# Patient Record
Sex: MALE | Race: WHITE | Employment: FULL TIME | URBAN - METROPOLITAN AREA
[De-identification: names, ages, dates, MRNs, and addresses within clinical notes are randomized per-mention and may not be internally consistent; named-entity substitution may affect disease eponyms.]

---

## 2017-01-09 ENCOUNTER — TELEPHONE (OUTPATIENT)
Dept: UROLOGY | Facility: CLINIC | Age: 37
End: 2017-01-09

## 2017-01-09 ENCOUNTER — MYC MEDICAL ADVICE (OUTPATIENT)
Dept: UROLOGY | Facility: CLINIC | Age: 37
End: 2017-01-09

## 2017-01-09 ENCOUNTER — OFFICE VISIT (OUTPATIENT)
Dept: UROLOGY | Facility: CLINIC | Age: 37
End: 2017-01-09

## 2017-01-09 VITALS
HEART RATE: 60 BPM | HEIGHT: 69 IN | DIASTOLIC BLOOD PRESSURE: 69 MMHG | SYSTOLIC BLOOD PRESSURE: 122 MMHG | BODY MASS INDEX: 29.77 KG/M2 | WEIGHT: 201 LBS

## 2017-01-09 DIAGNOSIS — N20.1 CALCULUS OF URETER: Primary | ICD-10-CM

## 2017-01-09 DIAGNOSIS — R31.0 GROSS HEMATURIA: Primary | ICD-10-CM

## 2017-01-09 DIAGNOSIS — N50.811 TESTICULAR PAIN, RIGHT: ICD-10-CM

## 2017-01-09 LAB
ALBUMIN UR-MCNC: NEGATIVE MG/DL
APPEARANCE UR: CLEAR
BILIRUB UR QL STRIP: NEGATIVE
COLOR UR AUTO: YELLOW
GLUCOSE UR STRIP-MCNC: NEGATIVE MG/DL
HGB UR QL STRIP: ABNORMAL
KETONES UR STRIP-MCNC: NEGATIVE MG/DL
LEUKOCYTE ESTERASE UR QL STRIP: NEGATIVE
MUCOUS THREADS #/AREA URNS LPF: PRESENT /LPF
NITRATE UR QL: NEGATIVE
PH UR STRIP: 5 PH (ref 5–7)
RBC #/AREA URNS AUTO: 6 /HPF (ref 0–2)
SP GR UR STRIP: 1.01 (ref 1–1.03)
SQUAMOUS #/AREA URNS AUTO: <1 /HPF (ref 0–1)
URN SPEC COLLECT METH UR: ABNORMAL
UROBILINOGEN UR STRIP-MCNC: 0 MG/DL (ref 0–2)
WBC #/AREA URNS AUTO: <1 /HPF (ref 0–2)

## 2017-01-09 RX ORDER — TAMSULOSIN HYDROCHLORIDE 0.4 MG/1
0.4 CAPSULE ORAL DAILY
Qty: 30 CAPSULE | Refills: 0 | Status: SHIPPED | OUTPATIENT
Start: 2017-01-09

## 2017-01-09 RX ORDER — OXYCODONE HYDROCHLORIDE 5 MG/1
5-10 TABLET ORAL EVERY 6 HOURS PRN
Qty: 18 TABLET | Refills: 0 | Status: SHIPPED | OUTPATIENT
Start: 2017-01-09 | End: 2017-02-06

## 2017-01-09 ASSESSMENT — ENCOUNTER SYMPTOMS
HEARTBURN: 0
HYPOTENSION: 0
BLOATING: 0
HEMATURIA: 1
CLAUDICATION: 0
RECTAL PAIN: 0
FLANK PAIN: 0
ORTHOPNEA: 0
LIGHT-HEADEDNESS: 0
LEG SWELLING: 0
DIARRHEA: 0
ABDOMINAL PAIN: 1
EXERCISE INTOLERANCE: 0
PALPITATIONS: 1
BOWEL INCONTINENCE: 0
NAUSEA: 0
DIFFICULTY URINATING: 0
SYNCOPE: 0
TACHYCARDIA: 1
CONSTIPATION: 0
LEG PAIN: 0
DYSURIA: 0
BLOOD IN STOOL: 0
RECTAL BLEEDING: 0
SLEEP DISTURBANCES DUE TO BREATHING: 0
VOMITING: 0
HYPERTENSION: 0
JAUNDICE: 0

## 2017-01-09 NOTE — PROGRESS NOTES
CC: gross hematuria, testicular pain    HPI: It is a pleasure to see Mr. Jimbo Flores, a very pleasant 36 year old male seen today in the urology clinic for evaluation of gross hematuria and right-sided testicular pain. Symptoms started on 12/19/16 when he experienced some fairly intense pain in the lower abdomen which seemed to radiate into the right testicle. That night, he noticed that his urine was pink. This persisted for 24 hours and then both hematuria and pain resolved. Incidentally, he had recurrence of the pain yesterday, although this time it is localized to the right testicle. Currently feels a dull ache in the right testicle, localized to the back side of the testicle, rates it 3/10. Nothing seems to make it worse or better. No gross hematuria currently. He denies dysuria, frequency, urgency, hesitancy, intermittency, abdominal/back pain, fevers, chills, N/V. No history of kidney stones or UTI's. No concern for STD. No recent injury/trauma.    Review of Diagnostics:  12/20/16 - UA: large blood, 0-2 RBC, 0-2 WBC    Hematuria Risk Factors:  Age >40: no  Smoking history: no  Occupational exposure to chemicals or dyes (ie, benzenes, aromatic amines): no  History of urologic disorder or disease: no  History of irritative voiding symptoms: no  History of urinary tract infection: no  Analgesic abuse: no  History of pelvic irradiation: no    Past Medical History   Diagnosis Date     Atrial fibrillation (H)      Past Surgical History   Procedure Laterality Date     Left knee exploratory arthroscopy       Cardiac surgery  12/23/2015     ablation     H ablation focal afib       Current Outpatient Prescriptions   Medication Sig Dispense Refill     escitalopram (LEXAPRO) 10 MG tablet Take 1 tablet (10 mg) by mouth daily 90 tablet 1     acetaZOLAMIDE (DIAMOX SEQUEL) 500 MG 12 hr capsule Take 1 capsule (500 mg) by mouth 4 times daily 120 capsule 11     acetaZOLAMIDE (DIAMOX SEQUEL) 500 MG capsule Take 1 capsule  (500 mg) by mouth 2 times daily 60 capsule 6     atenolol (TENORMIN) 50 MG tablet Take once daily as needed for palpitations. 30 tablet 3     Allergies   Allergen Reactions     Bees      swollen     FAMILY HISTORY: There is no reported history of genitourinary carcinoma. Father with BPH.  There is no history of urolithiasis.      SOCIAL HISTORY: The patient does not smoke cigarettes, minimal EtOH and no illicit drug use.    ROS: A comprehensive 14 point ROS was obtained and was positive for h/o paroxysmal A. Fib s/p ablation and otherwise negative except for that outlined above in the HPI.    PHYSICAL EXAM:   There were no vitals filed for this visit.  GENERAL: Well groomed, well developed, well nourished male in NAD.  HEENT: AT, NC, EOMI bilaterally.  SKIN: Warm to touch, dry.  No visible rashes or lesions on examined areas.  RESP: No increased respiratory effort.  MS: Full ROM in extremities.  : Circumcised penis, no penile plaques or lesions.  Orthotopic location of the urethral meatus without meatal stenosis. No discharge.  Testicles descended bilaterally, of normal size and consistency, no masses. Non-tender.  Left epididymis without mass, non-tender. Right epididymis without mass, very mild TTP.  Vas and cord normal bilaterally. No appreciable hernias.  ARRON: Deferred for now.  NEURO: Alert and oriented x 3.  PSYCH: Normal mood and affect, pleasant and agreeable during interview and exam.    LABS: UA/UC from today pending.    IMAGING: Gopi    ASSESSMENT and PLAN:    Mr. Jimbo Flores is a pleasant 36 year old male with right-sided scrotal pain and one episode of gross hematuria (pink urine) that first occurred on 12/19/16, resolved, and then pain occurred again yesterday and persists today. Exam is unremarkable aside from some very mild TTP of the right epididymis. Low suspicion for acute infectious process. UA from today pending. Possible that he may be passing/passed a small kidney stone? Will obtain  CT abd/pelv to further evaluate. Ordered as w/ and w/o contrast given gross hematuria.   - Ordered UA/UC, cytology  - Obtain CT urogram    Will call with results of above. Pending findings, consider additional evaluation to include cystoscopy. If scrotal pain worsens and CT scan is negative, consider trial of an antibiotic for possible early right-sided epididymitis.    Thank you for allowing me to participate in Mr. Flores's care.      About 25 minutes were spent with the patient today, > 50% in counseling and coordination of care.    Albina Brown PA-C  Department of Urology

## 2017-01-09 NOTE — TELEPHONE ENCOUNTER
Called patient to discuss results of CT abd/pelvis which demonstrates a 3x4 mm stone in the mid-right ureter with moderate right hydronephrosis. UA also positive for blood which correlates with the ureteral stone but no evidence for infection. Will start him on tamsulosin 0.4 mg daily as trial at medical expulsive therapy. Rx sent, side effects discussed. Instructed to  a strainer at the clinic and strain urine to hopefully catch any stone that passes. Submit any captured stones for analysis. He requests prescription for pain meds as ibuprofen is helpful but does not always work when his pain is moderate to severe. Rx for Roxicodone 5 mg (#18) will be left for patient, along with his strainer, at the main floor check in of the [facility] building. Will plan to update a KUB in 1-2 weeks to monitor stone progression, unless he passes the stone before then in which no x-ray is needed. Patient was provided the number to imaging at the [facility] and will call to schedule his KUB in 1-2 weeks. Discussed warning signs/ER precautions including fevers, chills, vomiting, severe pain not responsive to pain meds. Patient understands to proceed to the ED for prompt evaluation should any of these occur. Otherwise, will follow up in 1-2 weeks with KUB and I will call with results to determine next steps/follow up. Patient verbalized understanding and agreement with the above plan.  Albina Brown PA-C  Urology

## 2017-01-09 NOTE — NURSING NOTE
Chief Complaint   Patient presents with     Consult     testicaular pain        Marisela Mckinley MA

## 2017-01-09 NOTE — Clinical Note
1/9/2017       RE: Jimbo Flores  [address] [place] [address]     Dear Colleague,    Thank you for referring your patient, Jimbo Flores, to the [facility] at [facility]. Please see a copy of my visit note below.    CC: gross hematuria, testicular pain    HPI: It is a pleasure to see Mr. Jimbo Flores, a very pleasant 36 year old male seen today in the urology clinic for evaluation of gross hematuria and right-sided testicular pain. Symptoms started on 12/19/16 when he experienced some fairly intense pain in the lower abdomen which seemed to radiate into the right testicle. That night, he noticed that his urine was pink. This persisted for 24 hours and then both hematuria and pain resolved. Incidentally, he had recurrence of the pain yesterday, although this time it is localized to the right testicle. Currently feels a dull ache in the right testicle, localized to the back side of the testicle, rates it 3/10. Nothing seems to make it worse or better. No gross hematuria currently. He denies dysuria, frequency, urgency, hesitancy, intermittency, abdominal/back pain, fevers, chills, N/V. No history of kidney stones or UTI's. No concern for STD. No recent injury/trauma.    Review of Diagnostics:  12/20/16 - UA: large blood, 0-2 RBC, 0-2 WBC    Hematuria Risk Factors:  Age >40: no  Smoking history: no  Occupational exposure to chemicals or dyes (ie, benzenes, aromatic amines): no  History of urologic disorder or disease: no  History of irritative voiding symptoms: no  History of urinary tract infection: no  Analgesic abuse: no  History of pelvic irradiation: no    Past Medical History   Diagnosis Date     Atrial fibrillation (H)      Past Surgical History   Procedure Laterality Date     Left knee exploratory arthroscopy       Cardiac surgery  12/23/2015     ablation     H ablation focal afib       Current  Outpatient Prescriptions   Medication Sig Dispense Refill     escitalopram (LEXAPRO) 10 MG tablet Take 1 tablet (10 mg) by mouth daily 90 tablet 1     acetaZOLAMIDE (DIAMOX SEQUEL) 500 MG 12 hr capsule Take 1 capsule (500 mg) by mouth 4 times daily 120 capsule 11     acetaZOLAMIDE (DIAMOX SEQUEL) 500 MG capsule Take 1 capsule (500 mg) by mouth 2 times daily 60 capsule 6     atenolol (TENORMIN) 50 MG tablet Take once daily as needed for palpitations. 30 tablet 3     Allergies   Allergen Reactions     Bees      swollen     FAMILY HISTORY: There is no reported history of genitourinary carcinoma. Father with BPH.  There is no history of urolithiasis.      SOCIAL HISTORY: The patient does not smoke cigarettes, minimal EtOH and no illicit drug use.    ROS: A comprehensive 14 point ROS was obtained and was positive for h/o paroxysmal A. Fib s/p ablation and otherwise negative except for that outlined above in the HPI.    PHYSICAL EXAM:   There were no vitals filed for this visit.  GENERAL: Well groomed, well developed, well nourished male in NAD.  HEENT: AT, NC, EOMI bilaterally.  SKIN: Warm to touch, dry.  No visible rashes or lesions on examined areas.  RESP: No increased respiratory effort.  MS: Full ROM in extremities.  : Circumcised penis, no penile plaques or lesions.  Orthotopic location of the urethral meatus without meatal stenosis. No discharge.  Testicles descended bilaterally, of normal size and consistency, no masses. Non-tender.  Left epididymis without mass, non-tender. Right epididymis without mass, very mild TTP.  Vas and cord normal bilaterally. No appreciable hernias.  ARRON: Deferred for now.  NEURO: Alert and oriented x 3.  PSYCH: Normal mood and affect, pleasant and agreeable during interview and exam.    LABS: UA/UC from today pending.    IMAGING: Gopi    ASSESSMENT and PLAN:    Mr. Jimbo Flores is a pleasant 36 year old male with right-sided scrotal pain and one episode of gross hematuria  (pink urine) that first occurred on 12/19/16, resolved, and then pain occurred again yesterday and persists today. Exam is unremarkable aside from some very mild TTP of the right epididymis. Low suspicion for acute infectious process. UA from today pending. Possible that he may be passing/passed a small kidney stone? Will obtain CT abd/pelv to further evaluate. Ordered as w/ and w/o contrast given gross hematuria.   - Ordered UA/UC, cytology  - Obtain CT urogram    Will call with results of above. Pending findings, consider additional evaluation to include cystoscopy. If scrotal pain worsens and CT scan is negative, consider trial of an antibiotic for possible early right-sided epididymitis.    Thank you for allowing me to participate in Mr. Flores's care.      About 25 minutes were spent with the patient today, > 50% in counseling and coordination of care.    Albina Brown PA-C  Department of Urology

## 2017-01-10 LAB
BACTERIA SPEC CULT: NO GROWTH
COPATH REPORT: NORMAL
Lab: NORMAL
MICRO REPORT STATUS: NORMAL
SPECIMEN SOURCE: NORMAL

## 2017-02-06 DIAGNOSIS — R10.9 FLANK PAIN: ICD-10-CM

## 2017-02-06 DIAGNOSIS — N20.1 CALCULUS OF URETER: ICD-10-CM

## 2017-02-06 DIAGNOSIS — N20.1 RIGHT URETERAL CALCULUS: Primary | ICD-10-CM

## 2017-02-06 RX ORDER — OXYCODONE HYDROCHLORIDE 5 MG/1
5-10 TABLET ORAL EVERY 6 HOURS PRN
Qty: 24 TABLET | Refills: 0 | Status: SHIPPED | OUTPATIENT
Start: 2017-02-06

## 2017-02-07 ENCOUNTER — OFFICE VISIT (OUTPATIENT)
Dept: UROLOGY | Facility: CLINIC | Age: 37
End: 2017-02-07
Payer: COMMERCIAL

## 2017-02-07 ENCOUNTER — HOSPITAL ENCOUNTER (OUTPATIENT)
Facility: CLINIC | Age: 37
End: 2017-02-07
Attending: UROLOGY | Admitting: UROLOGY
Payer: COMMERCIAL

## 2017-02-07 ENCOUNTER — TELEPHONE (OUTPATIENT)
Dept: UROLOGY | Facility: CLINIC | Age: 37
End: 2017-02-07

## 2017-02-07 VITALS
SYSTOLIC BLOOD PRESSURE: 100 MMHG | BODY MASS INDEX: 29.62 KG/M2 | WEIGHT: 200 LBS | HEIGHT: 69 IN | DIASTOLIC BLOOD PRESSURE: 70 MMHG | HEART RATE: 80 BPM

## 2017-02-07 DIAGNOSIS — N20.0 CALCULUS OF KIDNEY: Primary | ICD-10-CM

## 2017-02-07 LAB
ALBUMIN UR-MCNC: NEGATIVE MG/DL
APPEARANCE UR: CLEAR
BILIRUB UR QL STRIP: NEGATIVE
COLOR UR AUTO: YELLOW
GLUCOSE UR STRIP-MCNC: NEGATIVE MG/DL
HGB UR QL STRIP: ABNORMAL
KETONES UR STRIP-MCNC: NEGATIVE MG/DL
LEUKOCYTE ESTERASE UR QL STRIP: NEGATIVE
NITRATE UR QL: NEGATIVE
PH UR STRIP: 6 PH (ref 5–7)
SP GR UR STRIP: 1.02 (ref 1–1.03)
URN SPEC COLLECT METH UR: ABNORMAL
UROBILINOGEN UR STRIP-ACNC: 1 EU/DL (ref 0.2–1)

## 2017-02-07 PROCEDURE — 81003 URINALYSIS AUTO W/O SCOPE: CPT | Performed by: UROLOGY

## 2017-02-07 PROCEDURE — 99213 OFFICE O/P EST LOW 20 MIN: CPT | Performed by: UROLOGY

## 2017-02-07 RX ORDER — TAMSULOSIN HYDROCHLORIDE 0.4 MG/1
0.4 CAPSULE ORAL DAILY
Qty: 30 CAPSULE | Refills: 1 | Status: SHIPPED | OUTPATIENT
Start: 2017-02-07

## 2017-02-07 RX ORDER — OXYCODONE HYDROCHLORIDE 5 MG/1
5 TABLET ORAL EVERY 4 HOURS PRN
Qty: 30 TABLET | Refills: 0 | Status: SHIPPED | OUTPATIENT
Start: 2017-02-07

## 2017-02-07 ASSESSMENT — PAIN SCALES - GENERAL: PAINLEVEL: MILD PAIN (2)

## 2017-02-07 NOTE — TELEPHONE ENCOUNTER
----- Message from Albina Hinojosa PA-C sent at 2/6/2017  4:16 PM CST -----  Contact: [phone]  Sven Patten,  Let's get him in for another CT abdomen/pelvis w/o contrast to see if the stone is still there (future order is placed).  I can sign a new Rx for pain meds - not sure what would have happened to the first one. As per protocol, he can pick this up at check in on the first floor.  Thanks!  Albina Hinojosa PA-C    ----- Message -----     From: Tre Cesar LPN     Sent: 2/6/2017   4:04 PM       To: Albina Hinojosa PA-C    His pain is back very high on the scale  Kidney stone must still be there.  He wants to know what to do   I tried to get an appt but you are booked out 3 weeks.  He also never received the pain medication from 1/9  Went downstairs and it was not down there?? Needs pain meds at this point. Thanks tre

## 2017-02-07 NOTE — PROGRESS NOTES
Office Visit Note      UROLOGIC DIAGNOSES:   Right ureteral calculus     CURRENT INTERVENTIONS:       HISTORY:   Patient with recent history of right testicular pain and hematuria, found on imaging to have a right mid ureteral calculus, ~ 5mm on CT scan from 2017.   Patient was asymptomatic for several weeks but this past weekend developed renal colic.   Denies stone passage. Denies fever, nausea, vomiting.     Would like to discuss options of MET (with repeat imaging) vs URS.   We discussed r/b/a of each including risks of radiation exposure vs risks of anesthesia and procedure.         PAST MEDICAL HISTORY:   Past Medical History   Diagnosis Date     Atrial fibrillation (H)      Tachycardia        PAST SURGICAL HISTORY:   Past Surgical History   Procedure Laterality Date     Left knee exploratory arthroscopy       Cardiac surgery  2015     ablation     H ablation focal afib         FAMILY HISTORY:   Family History   Problem Relation Age of Onset     Myocardial Infarction Paternal Grandmother       at 32 with MI     Myocardial Infarction Maternal Grandfather       in 50's with MI     Lipids Maternal Grandmother      Lipids Father      Lipids Mother      Glaucoma No family hx of      Macular Degeneration No family hx of        SOCIAL HISTORY:   Social History   Substance Use Topics     Smoking status: Never Smoker      Smokeless tobacco: Not on file     Alcohol Use: Yes      Comment: occasional ~ a drink or two ~ 2x/week       Current Outpatient Prescriptions   Medication     FINASTERIDE PO     oxyCODONE (ROXICODONE) 5 MG IR tablet     tamsulosin (FLOMAX) 0.4 MG capsule     tamsulosin (FLOMAX) 0.4 MG capsule     escitalopram (LEXAPRO) 10 MG tablet     acetaZOLAMIDE (DIAMOX SEQUEL) 500 MG capsule     atenolol (TENORMIN) 50 MG tablet     oxyCODONE (ROXICODONE) 5 MG IR tablet     [DISCONTINUED] acetaZOLAMIDE (DIAMOX SEQUEL) 500 MG 12 hr capsule     No current facility-administered medications for  "this visit.         PHYSICAL EXAM:    /70 mmHg  Pulse 80  Ht 1.753 m (5' 9\")  Wt 90.719 kg (200 lb)  BMI 29.52 kg/m2    HEENT: Normocephalic and atraumatic   Cardiac: Not done  Back/Flank: Not done  CNS/PNS: Not done  Respiratory: Normal non-labored breathing  Abdomen: Soft nontender and nondistended  Peripheral Vascular: Not done  Mental Status: Not done    Penis: Not done  Scrotal Skin: Not done  Testicles: Not done  Epididymis: Not done  Digital Rectal Exam:     Cystoscopy: Not done    Imaging: reviewed CT scan with the patient     Urinalysis: UA RESULTS:  Recent Labs   Lab Test  02/07/17   1507  01/09/17   0907   COLOR  Yellow  Yellow   APPEARANCE  Clear  Clear   URINEGLC  Negative  Negative   URINEBILI  Negative  Negative   URINEKETONE  Negative  Negative   SG  1.020  1.011   UBLD  Trace*  Moderate*   URINEPH  6.0  5.0   PROTEIN  Negative  Negative   UROBILINOGEN  1.0   --    NITRITE  Negative  Negative   LEUKEST  Negative  Negative   RBCU   --   6*   WBCU   --   <1       PSA:     Post Void Residual:     Other labs: None today      IMPRESSION:  35 y/o M with right ureteral calculus in the mid ureter, ~ 5mm     PLAN:  Will schedule for URS with laser lithotripsy and stent placement on 02/20/2017  Patient given tamsulosin rx and oxycodone rx   Strainer given to the patient today   Patient will consider option of CT scan prior to procedure   Will contact office if he passes the stone prior to procedure     Total Time: 15 minutes                                    Total in Consultation: greater than 50%                     "

## 2017-02-07 NOTE — NURSING NOTE
Chief Complaint   Patient presents with     Kidney Stone(s) Followup     Patient is here for kidney stone     Yung Champagne LPN 3:13 PM February 7, 2017

## 2017-02-07 NOTE — MR AVS SNAPSHOT
After Visit Summary   2/7/2017    Jimbo Flores    MRN: [id number]           Patient Information     Date Of Birth          1980        Visit Information        Provider Department      2/7/2017 2:40 PM Blossom Jones MD [facility] [place]        Today's Diagnoses     Calculus of kidney    -  1        Follow-ups after your visit        Your next 10 appointments already scheduled     Feb 23, 2017  8:00 AM   Cystoscopy with Blossom Jones MD, UA CYF   [facility] [place] ([facility])    [address]  [address]  [facility] [address]   [phone]            Apr 11, 2017  8:00 AM   RETURN NEURO with Thomas Gonzalez MD   [facility] ([facility])    [address]  [address]  [address] [address]  [facility] [id number] [phone]            May 15, 2017  8:40 AM   (Arrive by 8:25 AM)   RETURN ARRHYTHMIA with Joy Kent MD   [facility] ([facility])    [address] [address] [phone]              Future tests that were ordered for you today     Open Future Orders        Priority Expected Expires Ordered    CT Abdomen pelvis w/o contrast Routine  2/6/2018 2/6/2017            Who to contact     If you have questions or need follow up information about today's clinic visit or your schedule please contact [facility] [place] directly at [phone].  Normal or non-critical lab and imaging results will be communicated to you by MyChart, letter or phone within 4 business days after the clinic has received the results. If you do not hear from us within 7 days, please contact the clinic through MyChart or phone. If you have a critical or abnormal lab result, we will notify you by phone as soon as possible.  Submit refill requests through [employer] or call your  "pharmacy and they will forward the refill request to us. Please allow 3 business days for your refill to be completed.          Additional Information About Your Visit        [employer] Information     [employer] gives you secure access to your electronic health record. If you see a primary care provider, you can also send messages to your care team and make appointments. If you have questions, please call your primary care clinic.  If you do not have a primary care provider, please call [phone] and they will assist you.        Care EveryWhere ID     This is your Care EveryWhere ID. This could be used by other organizations to access your [place] medical records  [id number]        Your Vitals Were     Pulse Height BMI (Body Mass Index)             80 1.753 m (5' 9\") 29.52 kg/m2          Blood Pressure from Last 3 Encounters:   02/07/17 100/70   01/09/17 122/69   12/19/16 110/74    Weight from Last 3 Encounters:   02/07/17 90.719 kg (200 lb)   01/09/17 91.173 kg (201 lb)   12/19/16 91.513 kg (201 lb 12 oz)              We Performed the Following     UA without Microscopic          Today's Medication Changes          These changes are accurate as of: 2/7/17  3:47 PM.  If you have any questions, ask your nurse or doctor.               These medicines have changed or have updated prescriptions.        Dose/Directions    escitalopram 10 MG tablet   Commonly known as:  LEXAPRO   This may have changed:  how much to take   Used for:  Major depressive disorder, recurrent episode, mild (H)        Dose:  10 mg   Take 1 tablet (10 mg) by mouth daily   Quantity:  90 tablet   Refills:  1       * oxyCODONE 5 MG IR tablet   Commonly known as:  ROXICODONE   This may have changed:  Another medication with the same name was added. Make sure you understand how and when to take each.   Used for:  Calculus of ureter, Flank pain   Changed by:  Albina Hinojosa PA-C        Dose:  5-10 mg   Take 1-2 tablets (5-10 mg) by mouth every 6 " hours as needed for pain   Quantity:  24 tablet   Refills:  0       * oxyCODONE 5 MG IR tablet   Commonly known as:  ROXICODONE   This may have changed:  You were already taking a medication with the same name, and this prescription was added. Make sure you understand how and when to take each.   Used for:  Calculus of kidney   Changed by:  Blossom Jones MD        Dose:  5 mg   Take 1 tablet (5 mg) by mouth every 4 hours as needed for pain maximum 6 tablet(s) per day   Quantity:  30 tablet   Refills:  0       * tamsulosin 0.4 MG capsule   Commonly known as:  FLOMAX   This may have changed:  Another medication with the same name was added. Make sure you understand how and when to take each.   Used for:  Calculus of ureter   Changed by:  Albina Hinojosa PA-C        Dose:  0.4 mg   Take 1 capsule (0.4 mg) by mouth daily   Quantity:  30 capsule   Refills:  0       * tamsulosin 0.4 MG capsule   Commonly known as:  FLOMAX   This may have changed:  You were already taking a medication with the same name, and this prescription was added. Make sure you understand how and when to take each.   Used for:  Calculus of kidney   Changed by:  Blossom Jones MD        Dose:  0.4 mg   Take 1 capsule (0.4 mg) by mouth daily   Quantity:  30 capsule   Refills:  1       * Notice:  This list has 4 medication(s) that are the same as other medications prescribed for you. Read the directions carefully, and ask your doctor or other care provider to review them with you.         Where to get your medicines      These medications were sent to [facility] [place], [place] - [facility] [address]  [facility] [address], [place] [address]     Phone:  [phone]    - tamsulosin 0.4 MG capsule      Some of these will need a paper prescription and others can be bought over the counter.  Ask your nurse if you have questions.     Bring a paper prescription for each of these medications    - oxyCODONE 5 MG IR tablet              Primary Care Provider Office Phone # Fax #    Tereza Wilkins PA-C [phone]       [place] [address] [address] [address] [address]        Thank you!     Thank you for choosing [facility] [place]  for your care. Our goal is always to provide you with excellent care. Hearing back from our patients is one way we can continue to improve our services. Please take a few minutes to complete the written survey that you may receive in the mail after your visit with us. Thank you!             Your Updated Medication List - Protect others around you: Learn how to safely use, store and throw away your medicines at www.disposemymeds.org.          This list is accurate as of: 2/7/17  3:47 PM.  Always use your most recent med list.                   Brand Name Dispense Instructions for use    acetaZOLAMIDE 500 MG 12 hr capsule    DIAMOX SEQUEL    60 capsule    Take 1 capsule (500 mg) by mouth 2 times daily       atenolol 50 MG tablet    TENORMIN    30 tablet    Take once daily as needed for palpitations.       escitalopram 10 MG tablet    LEXAPRO    90 tablet    Take 1 tablet (10 mg) by mouth daily       FINASTERIDE PO      Take 1 mg by mouth every 3 days       * oxyCODONE 5 MG IR tablet    ROXICODONE    24 tablet    Take 1-2 tablets (5-10 mg) by mouth every 6 hours as needed for pain       * oxyCODONE 5 MG IR tablet    ROXICODONE    30 tablet    Take 1 tablet (5 mg) by mouth every 4 hours as needed for pain maximum 6 tablet(s) per day       * tamsulosin 0.4 MG capsule    FLOMAX    30 capsule    Take 1 capsule (0.4 mg) by mouth daily       * tamsulosin 0.4 MG capsule    FLOMAX    30 capsule    Take 1 capsule (0.4 mg) by mouth daily       * Notice:  This list has 4 medication(s) that are the same as other medications prescribed for you. Read the directions carefully, and ask your doctor or other care provider to review them with  you.

## 2017-02-07 NOTE — Clinical Note
2017       RE: Jimbo Flores  [address] [place] [address]     Dear Colleague,    Thank you for referring your patient, Jimbo Flores, to the [facility] at [facility]. Please see a copy of my visit note below.    Office Visit Note      UROLOGIC DIAGNOSES:   Right ureteral calculus     CURRENT INTERVENTIONS:       HISTORY:   Patient with recent history of right testicular pain and hematuria, found on imaging to have a right mid ureteral calculus, ~ 5mm on CT scan from 2017.   Patient was asymptomatic for several weeks but this past weekend developed renal colic.   Denies stone passage. Denies fever, nausea, vomiting.     Would like to discuss options of MET (with repeat imaging) vs URS.   We discussed r/b/a of each including risks of radiation exposure vs risks of anesthesia and procedure.         PAST MEDICAL HISTORY:   Past Medical History   Diagnosis Date     Atrial fibrillation (H)      Tachycardia        PAST SURGICAL HISTORY:   Past Surgical History   Procedure Laterality Date     Left knee exploratory arthroscopy       Cardiac surgery  2015     ablation     H ablation focal afib         FAMILY HISTORY:   Family History   Problem Relation Age of Onset     Myocardial Infarction Paternal Grandmother       at 32 with MI     Myocardial Infarction Maternal Grandfather       in 50's with MI     Lipids Maternal Grandmother      Lipids Father      Lipids Mother      Glaucoma No family hx of      Macular Degeneration No family hx of        SOCIAL HISTORY:   Social History   Substance Use Topics     Smoking status: Never Smoker      Smokeless tobacco: Not on file     Alcohol Use: Yes      Comment: occasional ~ a drink or two ~ 2x/week       Current Outpatient Prescriptions   Medication     FINASTERIDE PO     oxyCODONE (ROXICODONE) 5 MG IR tablet     tamsulosin (FLOMAX) 0.4 MG capsule     tamsulosin  "(FLOMAX) 0.4 MG capsule     escitalopram (LEXAPRO) 10 MG tablet     acetaZOLAMIDE (DIAMOX SEQUEL) 500 MG capsule     atenolol (TENORMIN) 50 MG tablet     oxyCODONE (ROXICODONE) 5 MG IR tablet     [DISCONTINUED] acetaZOLAMIDE (DIAMOX SEQUEL) 500 MG 12 hr capsule     No current facility-administered medications for this visit.         PHYSICAL EXAM:    /70 mmHg  Pulse 80  Ht 1.753 m (5' 9\")  Wt 90.719 kg (200 lb)  BMI 29.52 kg/m2    HEENT: Normocephalic and atraumatic   Cardiac: Not done  Back/Flank: Not done  CNS/PNS: Not done  Respiratory: Normal non-labored breathing  Abdomen: Soft nontender and nondistended  Peripheral Vascular: Not done  Mental Status: Not done    Penis: Not done  Scrotal Skin: Not done  Testicles: Not done  Epididymis: Not done  Digital Rectal Exam:     Cystoscopy: Not done    Imaging: reviewed CT scan with the patient     Urinalysis: UA RESULTS:  Recent Labs   Lab Test  02/07/17   1507  01/09/17   0907   COLOR  Yellow  Yellow   APPEARANCE  Clear  Clear   URINEGLC  Negative  Negative   URINEBILI  Negative  Negative   URINEKETONE  Negative  Negative   SG  1.020  1.011   UBLD  Trace*  Moderate*   URINEPH  6.0  5.0   PROTEIN  Negative  Negative   UROBILINOGEN  1.0   --    NITRITE  Negative  Negative   LEUKEST  Negative  Negative   RBCU   --   6*   WBCU   --   <1       PSA:     Post Void Residual:     Other labs: None today      IMPRESSION:  35 y/o M with right ureteral calculus in the mid ureter, ~ 5mm     PLAN:  Will schedule for URS with laser lithotripsy and stent placement on 02/20/2017  Patient given tamsulosin rx and oxycodone rx   Strainer given to the patient today   Patient will consider option of CT scan prior to procedure   Will contact office if he passes the stone prior to procedure     Total Time: 15 minutes                                    Total in Consultation: greater than 50%       Again, thank you for allowing me to participate in the care of your patient.  "     Sincerely,    Blossom Jones MD

## 2017-02-08 ENCOUNTER — CARE COORDINATION (OUTPATIENT)
Dept: UROLOGY | Facility: CLINIC | Age: 37
End: 2017-02-08

## 2017-02-08 NOTE — PROGRESS NOTES
Pre Op Teaching Flowsheet       Pre and Post op Patient Education  Relevant Diagnosis:  stones  Teaching Topic:  Pre and post op teaching  Person Involved in teaching: Jimbo Flores    Motivation Level:  Asks Questions: Yes  Eager to Learn:  Yes  Cooperative: Yes  Receptive (willing/able to accept information):  Yes  Patient demonstrates understanding of the following:  Date and time of surgery:  February 20th.  Location of surgery:    History and Physical and any other testing necessary prior to surgery: Yes, Patient is having his pre-op in the [facility] system  Required time line for completion of History and Physical and any pre-op testing: Yes    NPO Guidelines: Nothing to eat 8hrs prior, Nothing to drink 2hrs prior    Patient demonstrates understanding of the following:  Pre-op bowel prep:  N/A  Medications to take the day of surgery:  Per PCP  Blood thinner medications discussed and when to stop (if applicable):  Yes  Diabetes medication management (if applicable):  N/A  Discussed pain control after surgery:   Infection Prevention: Patient demonstrates understanding of the following:  Surgical procedure site care taught: N/A  Signs and symptoms of infection taught:  Yes  Wound care will be taught at the time of discharge.  Central venous catheter care will be taught at the time of discharge (if applicable).    Post-op follow-up:  Discussed how to contact the hospital, nurse, and clinic scheduling staff if necessary.    Instructional materials used/given/mailed:  [facility] Booklet, post op teaching sheet, Map, Soap, and arrival/location information.    Surgical instructions packet given to patient in office:  No, mailed to patient.

## 2017-02-09 ENCOUNTER — OFFICE VISIT (OUTPATIENT)
Dept: FAMILY MEDICINE | Facility: CLINIC | Age: 37
End: 2017-02-09
Payer: COMMERCIAL

## 2017-02-09 VITALS
BODY MASS INDEX: 29.82 KG/M2 | WEIGHT: 202 LBS | TEMPERATURE: 98.4 F | HEART RATE: 81 BPM | DIASTOLIC BLOOD PRESSURE: 66 MMHG | RESPIRATION RATE: 21 BRPM | SYSTOLIC BLOOD PRESSURE: 102 MMHG | OXYGEN SATURATION: 97 %

## 2017-02-09 DIAGNOSIS — Z01.818 PREOP GENERAL PHYSICAL EXAM: Primary | ICD-10-CM

## 2017-02-09 DIAGNOSIS — F33.0 MAJOR DEPRESSIVE DISORDER, RECURRENT EPISODE, MILD (H): ICD-10-CM

## 2017-02-09 DIAGNOSIS — Z86.79 S/P ABLATION OF ATRIAL FIBRILLATION: ICD-10-CM

## 2017-02-09 DIAGNOSIS — N20.0 CALCULUS OF KIDNEY: ICD-10-CM

## 2017-02-09 DIAGNOSIS — Z98.890 S/P ABLATION OF ATRIAL FIBRILLATION: ICD-10-CM

## 2017-02-09 LAB
ALBUMIN UR-MCNC: NEGATIVE MG/DL
APPEARANCE UR: CLEAR
BILIRUB UR QL STRIP: NEGATIVE
COLOR UR AUTO: YELLOW
GLUCOSE UR STRIP-MCNC: NEGATIVE MG/DL
HGB BLD-MCNC: 15.4 G/DL (ref 13.3–17.7)
HGB UR QL STRIP: NEGATIVE
KETONES UR STRIP-MCNC: NEGATIVE MG/DL
LEUKOCYTE ESTERASE UR QL STRIP: NEGATIVE
NITRATE UR QL: NEGATIVE
PH UR STRIP: 7 PH (ref 5–7)
SP GR UR STRIP: 1.01 (ref 1–1.03)
URN SPEC COLLECT METH UR: NORMAL
UROBILINOGEN UR STRIP-ACNC: 0.2 EU/DL (ref 0.2–1)

## 2017-02-09 PROCEDURE — 80053 COMPREHEN METABOLIC PANEL: CPT | Performed by: PHYSICIAN ASSISTANT

## 2017-02-09 PROCEDURE — 36415 COLL VENOUS BLD VENIPUNCTURE: CPT | Performed by: PHYSICIAN ASSISTANT

## 2017-02-09 PROCEDURE — 81003 URINALYSIS AUTO W/O SCOPE: CPT | Performed by: PHYSICIAN ASSISTANT

## 2017-02-09 PROCEDURE — 85018 HEMOGLOBIN: CPT | Performed by: PHYSICIAN ASSISTANT

## 2017-02-09 PROCEDURE — 93000 ELECTROCARDIOGRAM COMPLETE: CPT | Performed by: PHYSICIAN ASSISTANT

## 2017-02-09 PROCEDURE — 99214 OFFICE O/P EST MOD 30 MIN: CPT | Performed by: PHYSICIAN ASSISTANT

## 2017-02-09 NOTE — PROGRESS NOTES
[place] [id number] [place] [id number] [phone]  Dept: [phone]    PRE-OP EVALUATION:  Today's date: 2017    Jimbo Flores (: 1980) presents for pre-operative evaluation assessment as requested by Dr. Jones.  He requires evaluation and anesthesia risk assessment prior to undergoing surgery/procedure for treatment of Right Ureteral Stone.  Proposed procedure: Combined cystoscopy, ureteroscopy, laser holmium lithotripsy ureter     Date of Surgery/ Procedure: 17  Time of Surgery/ Procedure: 5:20 p/m  Hospital/Surgical Facility: [facility]  Fax number for surgical facility:   Primary Physician: Tereza Wilkins  Type of Anesthesia Anticipated: to be determined    Patient has a Health Care Directive or Living Will:  NO    1. NO - Do you have a history of heart attack, stroke, stent, bypass or surgery on an artery in the head, neck, heart or legs?  2. NO - Do you ever have any pain or discomfort in your chest?  3. NO - Do you have a history of  Heart Failure?  4. NO - Are you troubled by shortness of breath when: walking on the level, up a slight hill or at night?  5. NO - Do you currently have a cold, bronchitis or other respiratory infection?  6. NO - Do you have a cough, shortness of breath or wheezing?  7. NO - Do you sometimes get pains in the calves of your legs when you walk?  8. NO - Do you or anyone in your family have previous history of blood clots?  9. NO - Do you or does anyone in your family have a serious bleeding problem such as prolonged bleeding following surgeries or cuts?  10. NO - Have you ever had problems with anemia or been told to take iron pills?  11. NO - Have you had any abnormal blood loss such as black, tarry or bloody stools, or abnormal vaginal bleeding?  12. NO - Have you ever had a blood transfusion?  13. NO - Have you or any of your relatives ever had problems with anesthesia?  14. YES - DO YOU HAVE SLEEP APNEA,  EXCESSIVE SNORING OR DAYTIME DROWSINESS? Excessive snoring, but does not use CPAP  15. NO - Do you have any prosthetic heart valves?  16. NO - Do you have prosthetic joints?  17. NO - Is there any chance that you may be pregnant?      HPI:                                                      Brief HPI related to upcoming procedure: history of kidney stones with need for removal    See problem list for active medical problems.  Problems all longstanding and stable, except as noted/documented.  See ROS for pertinent symptoms related to these conditions.    A-FIB - Patient has a longstanding history of A-fib status post ablation therapy - has atenolol for tachycardia but rarely uses, hasn't used for 6+ months. Denies significant symptoms of lightheadedness, palpitations or dyspnea.    MEDICAL HISTORY:                                                      Patient Active Problem List    Diagnosis Date Noted     S/P ablation of atrial fibrillation 12/22/2015     Priority: Medium     CARDIOVASCULAR SCREENING; LDL GOAL LESS THAN 160 11/17/2015     Priority: Medium     Major depressive disorder, recurrent episode, mild (H) 11/17/2015     Priority: Medium     Paroxysmal atrial fibrillation (H) 10/07/2015     Priority: Medium     Pain in joint, pelvic region and thigh 01/07/2015     Priority: Medium     Hip pain 04/22/2010     Priority: Medium      Past Medical History   Diagnosis Date     Atrial fibrillation (H)      Tachycardia      Past Surgical History   Procedure Laterality Date     Left knee exploratory arthroscopy       Cardiac surgery  12/23/2015     ablation     H ablation focal afib       Current Outpatient Prescriptions   Medication Sig Dispense Refill     FINASTERIDE PO Take 1 mg by mouth every 3 days       oxyCODONE (ROXICODONE) 5 MG IR tablet Take 1 tablet (5 mg) by mouth every 4 hours as needed for pain maximum 6 tablet(s) per day 30 tablet 0     tamsulosin (FLOMAX) 0.4 MG capsule Take 1 capsule (0.4 mg) by mouth  daily 30 capsule 1     oxyCODONE (ROXICODONE) 5 MG IR tablet Take 1-2 tablets (5-10 mg) by mouth every 6 hours as needed for pain 24 tablet 0     tamsulosin (FLOMAX) 0.4 MG capsule Take 1 capsule (0.4 mg) by mouth daily 30 capsule 0     escitalopram (LEXAPRO) 10 MG tablet Take 1 tablet (10 mg) by mouth daily (Patient taking differently: Take 20 mg by mouth daily ) 90 tablet 1     acetaZOLAMIDE (DIAMOX SEQUEL) 500 MG capsule Take 1 capsule (500 mg) by mouth 2 times daily 60 capsule 6     atenolol (TENORMIN) 50 MG tablet Take once daily as needed for palpitations. 30 tablet 3     OTC products: None, except as noted above    Allergies   Allergen Reactions     Bees      swollen      Latex Allergy: NO    Social History   Substance Use Topics     Smoking status: Never Smoker      Smokeless tobacco: Not on file     Alcohol Use: Yes      Comment: occasional ~ a drink or two ~ 2x/week     History   Drug Use No       REVIEW OF SYSTEMS:                                                    Constitutional, neuro, ENT, endocrine, pulmonary, cardiac, gastrointestinal, genitourinary, musculoskeletal, integument and psychiatric systems are negative, except as otherwise noted.    EXAM:                                                    /66 mmHg  Pulse 81  Temp(Src) 98.4  F (36.9  C) (Oral)  Resp 21  Wt 202 lb (91.627 kg)  SpO2 97%    GENERAL APPEARANCE: healthy, alert and no distress     EYES: EOMI, - PERRL     HENT: ear canals and TM's normal and nose and mouth without ulcers or lesions     NECK: no adenopathy, no asymmetry, masses, or scars and thyroid normal to palpation     RESP: lungs clear to auscultation - no rales, rhonchi or wheezes     CV: regular rates and rhythm, normal S1 S2, no S3 or S4 and no murmur, click or rub -     ABDOMEN:  soft, nontender, no HSM or masses and bowel sounds normal     MS: extremities normal- no gross deformities noted, no evidence of inflammation in joints, FROM in all extremities.      SKIN: no suspicious lesions or rashes     NEURO: Normal strength and tone, sensory exam grossly normal, mentation intact and speech normal     PSYCH: mentation appears normal. and affect normal/bright     LYMPHATICS: No axillary, cervical, inguinal, or supraclavicular nodes    DIAGNOSTICS:                                                    EKG: appears normal, NSR, normal axis, normal intervals, no acute ST/T changes c/w ischemia, no LVH by voltage criteria, unchanged from previous tracings  Hemoglobin (indicated for history of anemia or procedure with significant blood loss such as tonsillectomy, major intraperitoneal surgery, vascular surgery, major spine surgery, total joint replacement)  Serum Potassium    Recent Labs   Lab Test  12/20/16   1018 03/09/16 02/24/16   12/22/15   0732   HGB  16.5   --    --    --   15.3   PLT  240   --    --    --   228   INR   --   2.2*  1.8*   < >  2.17*   NA  143   --    --    --   139   POTASSIUM  4.1   --    --    --   4.2   CR  1.01   --    --    --   0.88    < > = values in this interval not displayed.        IMPRESSION:                                                    Reason for surgery/procedure: cystoscopy, ureteroscopy, laser holmium lithotripsy ureter  Diagnosis/reason for consult: medical management/pre-op     The proposed surgical procedure is considered LOW risk.    REVISED CARDIAC RISK INDEX  The patient has the following serious cardiovascular risks for perioperative complications such as (MI, PE, VFib and 3  AV Block):  No serious cardiac risks  INTERPRETATION: 0 risks: Class I (very low risk - 0.4% complication rate)    The patient has the following additional risks for perioperative complications:  No identified additional risks      ICD-10-CM    1. Preop general physical exam Z01.818 Hemoglobin     Comprehensive metabolic panel     UA reflex to Microscopic and Culture   2. Calculus of kidney N20.0 Hemoglobin     Comprehensive metabolic panel     UA reflex to  Microscopic and Culture   3. S/P ablation of atrial fibrillation Z98.890 EKG 12-lead complete w/read - Clinics    Z86.79    4. Major depressive disorder, recurrent episode, mild (H) F33.0        RECOMMENDATIONS:                                                        APPROVAL GIVEN to proceed with proposed procedure, without further diagnostic evaluation       Signed Electronically by: Tereza Wilkins PA-C    Copy of this evaluation report is provided to requesting physician.    [place] Preop Guidelines

## 2017-02-09 NOTE — PROGRESS NOTES
Patient was not examined by me.  Chart and note reviewed.      Dylan Gifford MD  [facility]

## 2017-02-09 NOTE — NURSING NOTE
"Chief Complaint   Patient presents with     Pre-Op Exam       Initial /66 mmHg  Pulse 81  Temp(Src) 98.4  F (36.9  C) (Oral)  Resp 21  Wt 202 lb (91.627 kg)  SpO2 97% Estimated body mass index is 29.82 kg/(m^2) as calculated from the following:    Height as of 2/7/17: 5' 9\" (1.753 m).    Weight as of this encounter: 202 lb (91.627 kg).  Medication Reconciliation: complete   Cuff size: regular     Christelle Gonzalez CMA  "

## 2017-02-09 NOTE — MR AVS SNAPSHOT
After Visit Summary   2/9/2017    Jimbo Flores    MRN: [id number]           Patient Information     Date Of Birth          1980        Visit Information        Provider Department      2/9/2017 1:20 PM Tereza Wilkins PA-C [facility]        Today's Diagnoses     Preop general physical exam    -  1     Calculus of kidney         S/P ablation of atrial fibrillation         Major depressive disorder, recurrent episode, mild (H)           Care Instructions      Before Your Surgery      Call your surgeon if there is any change in your health. This includes signs of a cold or flu (such as a sore throat, runny nose, cough, rash or fever).    Do not smoke, drink alcohol or take over the counter medicine (unless your surgeon or primary care doctor tells you to) for the 24 hours before and after surgery.    If you take prescribed drugs: Follow your doctor s orders about which medicines to take and which to stop until after surgery.    Eating and drinking prior to surgery: follow the instructions from your surgeon    Take a shower or bath the night before surgery. Use the soap your surgeon gave you to gently clean your skin. If you do not have soap from your surgeon, use your regular soap. Do not shave or scrub the surgery site.  Wear clean pajamas and have clean sheets on your bed.         Follow-ups after your visit        Your next 10 appointments already scheduled     Feb 20, 2017   Procedure with Blossom Jones MD   [facility] [place], Same Day Surgery (--)    [address] [facility] [address]   [phone]            Feb 23, 2017  8:00 AM   Cystoscopy with Blossom Jones MD,  CYF   [facility] ([facility])    [address]  [address]  [facility] [address]   [phone]            Apr 11, 2017  8:00 AM   RETURN NEURO with Thomas Gonzalez MD   [facility] ([facility])    [place]  [place]  [address]  [address]  [facility] [address] [phone]            May 15, 2017  8:40 AM   (Arrive by 8:25 AM)   RETURN ARRHYTHMIA with Joy Kent MD   [facility] ([facility])    [address]  3rd Floor  [facility] [id number] [phone]              Who to contact     If you have questions or need follow up information about today's clinic visit or your schedule please contact [facility] directly at [phone].  Normal or non-critical lab and imaging results will be communicated to you by [employer], letter or phone within 4 business days after the clinic has received the results. If you do not hear from us within 7 days, please contact the clinic through [employer] or phone. If you have a critical or abnormal lab result, we will notify you by phone as soon as possible.  Submit refill requests through [employer] or call your pharmacy and they will forward the refill request to us. Please allow 3 business days for your refill to be completed.          Additional Information About Your Visit        [employer] Information     [employer] gives you secure access to your electronic health record. If you see a primary care provider, you can also send messages to your care team and make appointments. If you have questions, please call your primary care clinic.  If you do not have a primary care provider, please call [phone] and they will assist you.        Care EveryWhere ID     This is your Care EveryWhere ID. This could be used by other organizations to access your [place] medical records  [id number]        Your Vitals Were     Pulse Temperature Respirations Pulse Oximetry          81 98.4  F (36.9  C) (Oral) 21 97%         Blood Pressure from Last 3 Encounters:   02/09/17 102/66   02/07/17 100/70   01/09/17 122/69    Weight from Last 3 Encounters:   02/09/17 202 lb (91.627 kg)   02/07/17 200 lb (90.719 kg)   01/09/17 201  lb (91.173 kg)              We Performed the Following     Comprehensive metabolic panel     EKG 12-lead complete w/read - Clinics     Hemoglobin     UA reflex to Microscopic and Culture          Today's Medication Changes          These changes are accurate as of: 2/9/17  1:38 PM.  If you have any questions, ask your nurse or doctor.               These medicines have changed or have updated prescriptions.        Dose/Directions    escitalopram 10 MG tablet   Commonly known as:  LEXAPRO   This may have changed:  how much to take   Used for:  Major depressive disorder, recurrent episode, mild (H)        Dose:  10 mg   Take 1 tablet (10 mg) by mouth daily   Quantity:  90 tablet   Refills:  1                Primary Care Provider Office Phone # Fax #    Tereza Wilkins PA-C [phone]       [place] [address] [address] [facility] [address]        Thank you!     Thank you for choosing [facility]  for your care. Our goal is always to provide you with excellent care. Hearing back from our patients is one way we can continue to improve our services. Please take a few minutes to complete the written survey that you may receive in the mail after your visit with us. Thank you!             Your Updated Medication List - Protect others around you: Learn how to safely use, store and throw away your medicines at www.disposemymeds.org.          This list is accurate as of: 2/9/17  1:38 PM.  Always use your most recent med list.                   Brand Name Dispense Instructions for use    acetaZOLAMIDE 500 MG 12 hr capsule    DIAMOX SEQUEL    60 capsule    Take 1 capsule (500 mg) by mouth 2 times daily       atenolol 50 MG tablet    TENORMIN    30 tablet    Take once daily as needed for palpitations.       escitalopram 10 MG tablet    LEXAPRO    90 tablet    Take 1 tablet (10 mg) by mouth daily       FINASTERIDE PO      Take 1 mg by mouth every 3 days       *  oxyCODONE 5 MG IR tablet    ROXICODONE    24 tablet    Take 1-2 tablets (5-10 mg) by mouth every 6 hours as needed for pain       * oxyCODONE 5 MG IR tablet    ROXICODONE    30 tablet    Take 1 tablet (5 mg) by mouth every 4 hours as needed for pain maximum 6 tablet(s) per day       * tamsulosin 0.4 MG capsule    FLOMAX    30 capsule    Take 1 capsule (0.4 mg) by mouth daily       * tamsulosin 0.4 MG capsule    FLOMAX    30 capsule    Take 1 capsule (0.4 mg) by mouth daily       * Notice:  This list has 4 medication(s) that are the same as other medications prescribed for you. Read the directions carefully, and ask your doctor or other care provider to review them with you.

## 2017-02-10 LAB
ALBUMIN SERPL-MCNC: 4.1 G/DL (ref 3.4–5)
ALP SERPL-CCNC: 61 U/L (ref 40–150)
ALT SERPL W P-5'-P-CCNC: 68 U/L (ref 0–70)
ANION GAP SERPL CALCULATED.3IONS-SCNC: 5 MMOL/L (ref 3–14)
AST SERPL W P-5'-P-CCNC: 42 U/L (ref 0–45)
BILIRUB SERPL-MCNC: 0.8 MG/DL (ref 0.2–1.3)
BUN SERPL-MCNC: 12 MG/DL (ref 7–30)
CALCIUM SERPL-MCNC: 8.8 MG/DL (ref 8.5–10.1)
CHLORIDE SERPL-SCNC: 111 MMOL/L (ref 94–109)
CO2 SERPL-SCNC: 26 MMOL/L (ref 20–32)
CREAT SERPL-MCNC: 1.05 MG/DL (ref 0.66–1.25)
GFR SERPL CREATININE-BSD FRML MDRD: 79 ML/MIN/1.7M2
GLUCOSE SERPL-MCNC: 92 MG/DL (ref 70–99)
POTASSIUM SERPL-SCNC: 3.4 MMOL/L (ref 3.4–5.3)
PROT SERPL-MCNC: 7.2 G/DL (ref 6.8–8.8)
SODIUM SERPL-SCNC: 142 MMOL/L (ref 133–144)

## 2017-02-10 NOTE — PROGRESS NOTES
"Quick Note:    Arian Choi  Your attached labs are normal. Best of luck with your upcoming surgery (and move!).  Please contact the office with any questions or concerns.    Tereza De La Cruz \"Sanjay\" TIERRA Wilkins    ______  "

## 2019-11-08 ENCOUNTER — HEALTH MAINTENANCE LETTER (OUTPATIENT)
Age: 39
End: 2019-11-08

## 2020-12-06 ENCOUNTER — HEALTH MAINTENANCE LETTER (OUTPATIENT)
Age: 40
End: 2020-12-06

## 2021-09-25 ENCOUNTER — HEALTH MAINTENANCE LETTER (OUTPATIENT)
Age: 41
End: 2021-09-25

## 2022-01-15 ENCOUNTER — HEALTH MAINTENANCE LETTER (OUTPATIENT)
Age: 42
End: 2022-01-15

## 2023-01-07 ENCOUNTER — HEALTH MAINTENANCE LETTER (OUTPATIENT)
Age: 43
End: 2023-01-07

## 2023-04-22 ENCOUNTER — HEALTH MAINTENANCE LETTER (OUTPATIENT)
Age: 43
End: 2023-04-22